# Patient Record
Sex: MALE | Race: WHITE | NOT HISPANIC OR LATINO
[De-identification: names, ages, dates, MRNs, and addresses within clinical notes are randomized per-mention and may not be internally consistent; named-entity substitution may affect disease eponyms.]

---

## 2021-02-03 ENCOUNTER — APPOINTMENT (OUTPATIENT)
Dept: NEUROLOGY | Facility: CLINIC | Age: 81
End: 2021-02-03
Payer: MEDICARE

## 2021-02-03 VITALS
HEIGHT: 64 IN | TEMPERATURE: 97.5 F | SYSTOLIC BLOOD PRESSURE: 132 MMHG | DIASTOLIC BLOOD PRESSURE: 76 MMHG | HEART RATE: 89 BPM | BODY MASS INDEX: 30.56 KG/M2 | WEIGHT: 179 LBS

## 2021-02-03 DIAGNOSIS — Z82.49 FAMILY HISTORY OF ISCHEMIC HEART DISEASE AND OTHER DISEASES OF THE CIRCULATORY SYSTEM: ICD-10-CM

## 2021-02-03 DIAGNOSIS — Z83.3 FAMILY HISTORY OF DIABETES MELLITUS: ICD-10-CM

## 2021-02-03 DIAGNOSIS — R41.3 OTHER AMNESIA: ICD-10-CM

## 2021-02-03 DIAGNOSIS — Z86.79 PERSONAL HISTORY OF OTHER DISEASES OF THE CIRCULATORY SYSTEM: ICD-10-CM

## 2021-02-03 DIAGNOSIS — Z80.0 FAMILY HISTORY OF MALIGNANT NEOPLASM OF DIGESTIVE ORGANS: ICD-10-CM

## 2021-02-03 DIAGNOSIS — Z87.11 PERSONAL HISTORY OF PEPTIC ULCER DISEASE: ICD-10-CM

## 2021-02-03 DIAGNOSIS — Z87.891 PERSONAL HISTORY OF NICOTINE DEPENDENCE: ICD-10-CM

## 2021-02-03 DIAGNOSIS — Z86.718 PERSONAL HISTORY OF OTHER VENOUS THROMBOSIS AND EMBOLISM: ICD-10-CM

## 2021-02-03 DIAGNOSIS — E11.9 TYPE 2 DIABETES MELLITUS W/OUT COMPLICATIONS: ICD-10-CM

## 2021-02-03 DIAGNOSIS — Z78.9 OTHER SPECIFIED HEALTH STATUS: ICD-10-CM

## 2021-02-03 PROCEDURE — 99204 OFFICE O/P NEW MOD 45 MIN: CPT

## 2021-02-03 RX ORDER — OMEPRAZOLE 20 MG/1
20 CAPSULE, DELAYED RELEASE ORAL
Qty: 30 | Refills: 0 | Status: ACTIVE | COMMUNITY
Start: 2020-08-03

## 2021-02-03 RX ORDER — LEVOTHYROXINE SODIUM 0.09 MG/1
88 TABLET ORAL
Qty: 90 | Refills: 0 | Status: ACTIVE | COMMUNITY
Start: 2020-08-03

## 2021-02-03 RX ORDER — LISINOPRIL 10 MG/1
10 TABLET ORAL
Qty: 90 | Refills: 0 | Status: ACTIVE | COMMUNITY
Start: 2020-08-03

## 2021-02-03 RX ORDER — METOPROLOL SUCCINATE 100 MG/1
100 TABLET, EXTENDED RELEASE ORAL
Qty: 90 | Refills: 0 | Status: ACTIVE | COMMUNITY
Start: 2020-08-03

## 2021-02-03 RX ORDER — CHOLECALCIFEROL (VITAMIN D3) 25 MCG
25 MCG TABLET ORAL
Refills: 0 | Status: ACTIVE | COMMUNITY

## 2021-02-03 RX ORDER — INSULIN GLARGINE 100 [IU]/ML
100 INJECTION, SOLUTION SUBCUTANEOUS
Qty: 15 | Refills: 0 | Status: ACTIVE | COMMUNITY
Start: 2020-08-03

## 2021-02-03 RX ORDER — DABIGATRAN ETEXILATE MESYLATE 75 MG/1
75 CAPSULE ORAL TWICE DAILY
Refills: 0 | Status: ACTIVE | COMMUNITY
Start: 2020-08-03

## 2021-02-03 RX ORDER — ATORVASTATIN CALCIUM 20 MG/1
20 TABLET, FILM COATED ORAL
Refills: 0 | Status: ACTIVE | COMMUNITY

## 2021-02-03 RX ORDER — EXENATIDE 2 MG/.85ML
2 INJECTION, SUSPENSION, EXTENDED RELEASE SUBCUTANEOUS
Qty: 3 | Refills: 0 | Status: ACTIVE | COMMUNITY
Start: 2020-08-03

## 2021-02-03 RX ORDER — EMPAGLIFLOZIN 10 MG/1
10 TABLET, FILM COATED ORAL
Qty: 30 | Refills: 0 | Status: ACTIVE | COMMUNITY
Start: 2020-08-03

## 2021-02-03 NOTE — HISTORY OF PRESENT ILLNESS
[FreeTextEntry1] : 80-year-old right-handed male with PMH aches of DM, HTN, CAD, A. fib status post ablation, presents today accompanied by his wife for evaluation of possible recent stroke noted on CT scan head.\par \par Patient's wife who provided history but seems confused about the dates of symptom occurrence reports that in mid January 2021 patient had developed GI symptoms, this lasted for a few days, he seemed dehydrated, he was admitted to [facility] on 1/21/21, where he was initially told he was Covid- 19 positive later, was refuted and was told he had pneumonia but was Covid-19 negative and was discharged home. 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 \par Patient was hence recommended to have a neurological consult; of note, patient has been on Pradaxa but was taking it only once a day, his cardiologist Dr. Walker increased dose to 75 mg twice a day.\par \par Upon obtaining further history, patient reports he has been receiving home PT, his wife reports his mental confusion has improved since past 3-4 days, he denies focal motor weakness, visual blurring, double vision, paresthesias, headaches, gait ataxia or speech disturbance.\par \par \par \par \par \par \par \par \par \par \par \par \par \par \par \par \par \par \par \par \par \par \par \par \par \par \par \par \par \par \par \par \par \par \par \par \par \par \par \par \par \par \par \par \par \par \par \par \par \par \par \par \par \par \par \par \par \par \par \par \par \par \par \par \par \par \par \par \par \par \par \par \par \par \par \par \par \par \par \par \par \par \par \par \par \par \par \par \par \par \par \par \par \par \par \par \par \par \par \par \par \par \par \par \par \par \par \par \par \par \par \par \par \par \par \par \par \par \par \par \par \par \par \par \par \par \par \par \par \par \par \par \par \par \par \par \par \par \par \par \par \par \par \par \par \par \par \par \par \par \par \par \par \par \par \par \par \par \par \par \par \par \par \par \par \par \par \par \par \par \par \par \par \par \par \par \par \par

## 2021-02-03 NOTE — REVIEW OF SYSTEMS
[Feeling Tired] : feeling tired [Sleep Disturbances] : sleep disturbances [Anxiety] : anxiety [Depression] : depression [Change In Personality] : personality change [As Noted in HPI] : as noted in HPI [Memory Lapses or Loss] : memory loss [Decr. Concentrating Ability] : decreased concentrating ability [Poor Coordination] : poor coordination [Negative] : Heme/Lymph

## 2021-02-03 NOTE — DATA REVIEWED
[de-identified] : 1/26/21: CT head: compatible with microvascular ischemic change, note is made of a lacunar infarct in the caudate nuclei bilaterally, new since prior exam is right temporal lobe infarct. \par 1/28/20: A1c 8.1, TFTs normal, CRP 14.50, ESR 64

## 2021-02-03 NOTE — REASON FOR VISIT
[Post Hospitalization] : a post hospitalization visit [Spouse] : spouse [FreeTextEntry1] : Referred by Dr. Claudio for evaluation of possible recent stroke

## 2021-02-03 NOTE — DISCUSSION/SUMMARY
[FreeTextEntry1] : 80-year-old male with PMHx of DM, HTN, CAD, A-fib s/p ablation, was admitted to 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 Of note, patient has been on Pradaxa but was taking it only once a day, his cardiologist Dr. Walker increased dose to 75 mg twice a day.\par \par # Possible new lacunar infarct in addition to prior multiple lacunar infarcts on CT head; no gross residual deficits.\par \par - In order to determine lacunar versus embolic infarcts; I have recommended MRI of the brain\par - Follow up with echocardiogram, Holter and carotid Dopplers with Dr. Walker his cardiologist\par - Continue Pradaxa 75 mg b.i.d. in addition to Atorvastatin 20 mg daily for stroke prophylaxis\par \par # Mild-moderate cognitive impairment\par \par - I recommended follow up in 3-4 weeks, we will perform cognitive assessment to assess this further.\par - Labs: TSH, B12, Fol\par \par \par \par

## 2021-02-03 NOTE — PHYSICAL EXAM
[General Appearance - Alert] : alert [General Appearance - In No Acute Distress] : in no acute distress [General Appearance - Well-Appearing] : healthy appearing [Mood] : the mood was normal [Registration Intact] : recent registration memory intact [Naming Objects] : no difficulty naming common objects [Repeating Phrases] : no difficulty repeating a phrase [Fluency] : fluency intact [Comprehension] : comprehension intact [Cranial Nerves Optic (II)] : visual acuity intact bilaterally,  visual fields full to confrontation, pupils equal round and reactive to light [Cranial Nerves Oculomotor (III)] : extraocular motion intact [Cranial Nerves Facial (VII)] : face symmetrical [Cranial Nerves Vestibulocochlear (VIII)] : hearing was intact bilaterally [Cranial Nerves Accessory (XI - Cranial And Spinal)] : head turning and shoulder shrug symmetric [Cranial Nerves Hypoglossal (XII)] : there was no tongue deviation with protrusion [Motor Tone] : muscle tone was normal in all four extremities [Motor Strength] : muscle strength was normal in all four extremities [Sensation Tactile Decrease] : light touch was intact [2+] : Brachioradialis left 2+ [1+] : Patella left 1+ [0] : Ankle jerk left 0 [PERRL With Normal Accommodation] : pupils were equal in size, round, reactive to light, with normal accommodation [Extraocular Movements] : extraocular movements were intact [Full Visual Field] : full visual field [Hearing Threshold Finger Rub Not [place]tinger] : hearing was normal [Neck Cervical Mass (___cm)] : no neck mass was observed [Auscultation Breath Sounds / Voice Sounds] : lungs were clear to auscultation bilaterally [Heart Rate And Rhythm] : heart rate was normal and rhythm regular [Heart Sounds] : normal S1 and S2 [Arterial Pulses Carotid] : carotid pulses were normal with no bruits [Edema] : there was no peripheral edema [Involuntary Movements] : no involuntary movements were seen [] : no rash [Coordination - Dysmetria Impaired Finger-to-Nose Bilateral] : not present [FreeTextEntry4] : Patient oriented to year and date, not to month, he does not recall the current or recent presidents, short-term recall impaired

## 2021-02-08 ENCOUNTER — NON-APPOINTMENT (OUTPATIENT)
Age: 81
End: 2021-02-08

## 2021-03-16 ENCOUNTER — APPOINTMENT (OUTPATIENT)
Dept: NEUROLOGY | Facility: CLINIC | Age: 81
End: 2021-03-16
Payer: MEDICARE

## 2021-03-16 VITALS
HEIGHT: 64 IN | WEIGHT: 188 LBS | DIASTOLIC BLOOD PRESSURE: 70 MMHG | HEART RATE: 71 BPM | BODY MASS INDEX: 32.1 KG/M2 | TEMPERATURE: 97.2 F | SYSTOLIC BLOOD PRESSURE: 168 MMHG

## 2021-03-16 DIAGNOSIS — I69.393 ATAXIA FOLLOWING CEREBRAL INFARCTION: ICD-10-CM

## 2021-03-16 PROCEDURE — 99214 OFFICE O/P EST MOD 30 MIN: CPT

## 2021-03-16 NOTE — REASON FOR VISIT
[Follow-Up: _____] : a [unfilled] follow-up visit [FreeTextEntry1] : for stroke, MRI brain results and gait instability

## 2021-03-16 NOTE — REVIEW OF SYSTEMS
[Sleep Disturbances] : sleep disturbances [Anxiety] : anxiety [Depression] : depression [As Noted in HPI] : as noted in HPI [Poor Coordination] : poor coordination [Negative] : Heme/Lymph

## 2021-03-16 NOTE — HISTORY OF PRESENT ILLNESS
[FreeTextEntry1] : Pt is here for F/U today, last seen on 2/3/21.Patient reports he has noted remarkable improvement in his mental sensorium, he reports he is clear headed, has no difficulties with articulation, concentration, focusing, mental fog has cleared, has no speech difficulty. He does admit that he has difficulty ascending and descending stairs, his gait is mildly unstable. \par \par MRI brain reveals scattered chronic infarcts, largest in the right temporal lobe, no acute infarct is noted.\par \par I recommended continuing Pradaxa and atorvastatin\par \par Patient is requesting clearance to return to work, he does admit since his gait is unstable he could benefit from physical therapy before going back full time\par \par

## 2021-03-16 NOTE — DATA REVIEWED
[de-identified] : 2/4/21: MRI brain reveals scattered chronic infarcts, largest in the right temporal lobe, no acute infarct is noted.\par \par  [de-identified] : 1/26/21: CT head: compatible with microvascular ischemic change, note is made of a lacunar infarct in the caudate nuclei bilaterally, new since prior exam is right temporal lobe infarct. \par 1/28/20: A1c 8.1, TFTs normal, CRP 14.50, ESR 64

## 2021-03-16 NOTE — DISCUSSION/SUMMARY
[FreeTextEntry1] : 80-year-old male with PMHx of DM, HTN, CAD, A-fib s/p ablation, was admitted to [facility] on 1/21/21, initially told positive Covid-19, refuted, then told he had pneumonia, Covid-19 negative, upon discharged home, continued to be confused, CT head ordered by PMD revealed lacunar infarcts B/L caudate nuclei and a new right temporal lobe infarct. Of note, patient has been on Pradaxa but was taking it only once a day, his cardiologist Dr. Walker increased dose to 75 mg twice a day.\par \par # New lacunar infarct, prior multiple lacunar infarcts on CT head; MRI brain reveals scattered chronic infarcts, largest in the right temporal lobe, no acute infarct is noted.\par \par # Unstable gait: could be multifactorial, subcortical arthropathy secondary to multiple strokes, diabetic neuropathy as well as recent illness may all be contributory.\par \par - Continue Pradaxa 75 mg b.i.d. in addition to Atorvastatin 20 mg daily for stroke prophylaxis\par - Start Physical therapy twice weekly x 4 weeks\par - Will follow up with patient in 4-5 weeks, if improved can return to work\par \par # Mild cognitive impairment/ Acute encephalopathy - most likely toxic metabolic; fully resolved\par \par - No further workup needed\par \par \par \par

## 2021-04-21 ENCOUNTER — APPOINTMENT (OUTPATIENT)
Dept: NEUROLOGY | Facility: CLINIC | Age: 81
End: 2021-04-21
Payer: MEDICARE

## 2021-04-21 VITALS
SYSTOLIC BLOOD PRESSURE: 124 MMHG | TEMPERATURE: 97.5 F | WEIGHT: 190 LBS | HEIGHT: 64 IN | HEART RATE: 69 BPM | BODY MASS INDEX: 32.44 KG/M2 | DIASTOLIC BLOOD PRESSURE: 68 MMHG

## 2021-04-21 PROCEDURE — 99214 OFFICE O/P EST MOD 30 MIN: CPT

## 2021-04-21 NOTE — REVIEW OF SYSTEMS
[As Noted in HPI] : as noted in HPI [Poor Coordination] : poor coordination [Negative] : Psychiatric

## 2021-04-21 NOTE — DATA REVIEWED
[de-identified] : 2/4/21: MRI brain reveals scattered chronic infarcts, largest in the right temporal lobe, no acute infarct is noted.\par \par  [de-identified] : 1/26/21: CT head: compatible with microvascular ischemic change, note is made of a lacunar infarct in the caudate nuclei bilaterally, new since prior exam is right temporal lobe infarct. \par 1/28/20: A1c 8.1, TFTs normal, CRP 14.50, ESR 64

## 2021-04-21 NOTE — DISCUSSION/SUMMARY
[FreeTextEntry1] : 80-year-old male with PMHx of DM, HTN, CAD, A-fib s/p ablation, was admitted to [facility] on 1/21/21, initially told positive Covid-19, then told he had pneumonia, Covid-19 negative, upon d/c home, was confused, CT head ordered by PMD revealed lacunar infarcts and a new right temporal lobe infarct. Of note, patient is on Pradaxa but was taking it only once a day, his cardio increased dose to 75 mg bid.\par \par # New lacunar infarct, prior multiple lacunar infarcts on CT head; MRI brain reveals scattered chronic infarcts, largest in the right temporal lobe, no acute infarct is noted.\par \par # Unstable gait: could be multifactorial, subcortical arthropathy secondary to multiple strokes, diabetic neuropathy as well as recent illness may all be contributory.\par \par - Continue Pradaxa 75 mg b.i.d. in addition to Atorvastatin 20 mg daily for stroke prophylaxis\par - continue Physical therapy twice weekly x 3 more weeks\par - follow up in 8 weeks,\par - Pt may return to work, he has been advised to be cautious about ascending and descending stairs and carrying heavy objects\par \par # Mild cognitive impairment/ Acute encephalopathy - most likely toxic metabolic; fully resolved\par \par - No further workup needed\par \par \par \par

## 2021-04-21 NOTE — HISTORY OF PRESENT ILLNESS
[FreeTextEntry1] : Patient is here for follow up visit today, last seen on 3/16/21. He reports he has been attending physical therapy, he has noted remarkable improvement in his balance and gait, he states that he is able to go down the basement stairs with caution, he has not had any falls, denies focal motor weakness. \par \par Patient denies cognitive difficulties, he is articulating and verbalizing normally, he denies difficulties with concentration or focusing or speech difficulty.\par \par Patient is on Pradaxa and atorvastatin for stroke prophylaxis.\par \par The patient wishes to return back to work in the next 2 weeks, he reports he feels fit to go back to his job full time.

## 2021-04-21 NOTE — PHYSICAL EXAM
[General Appearance - Alert] : alert [General Appearance - In No Acute Distress] : in no acute distress [General Appearance - Well-Appearing] : healthy appearing [Mood] : the mood was normal [Person] : oriented to person [Place] : oriented to place [Time] : oriented to time [Registration Intact] : recent registration memory intact [Naming Objects] : no difficulty naming common objects [Repeating Phrases] : no difficulty repeating a phrase [Fluency] : fluency intact [Comprehension] : comprehension intact [Cranial Nerves Optic (II)] : visual acuity intact bilaterally,  visual fields full to confrontation, pupils equal round and reactive to light [Cranial Nerves Oculomotor (III)] : extraocular motion intact [Cranial Nerves Facial (VII)] : face symmetrical [Cranial Nerves Vestibulocochlear (VIII)] : hearing was intact bilaterally [Cranial Nerves Accessory (XI - Cranial And Spinal)] : head turning and shoulder shrug symmetric [Cranial Nerves Hypoglossal (XII)] : there was no tongue deviation with protrusion [Motor Tone] : muscle tone was normal in all four extremities [Motor Strength] : muscle strength was normal in all four extremities [Sensation Tactile Decrease] : light touch was intact [2+] : Brachioradialis left 2+ [1+] : Patella left 1+ [0] : Ankle jerk left 0 [Coordination - Dysmetria Impaired Finger-to-Nose Bilateral] : not present [FreeTextEntry4] : Recall the current and recent past presidents [FreeTextEntry8] : Gait: Based-based gait: able to walk on toes, difficulty walking  tandem

## 2021-04-21 NOTE — REASON FOR VISIT
[Follow-Up: _____] : a [unfilled] follow-up visit [FreeTextEntry1] : for gait instability / Patient needs clearance to return back to work.

## 2021-06-02 ENCOUNTER — APPOINTMENT (OUTPATIENT)
Dept: NEUROLOGY | Facility: CLINIC | Age: 81
End: 2021-06-02
Payer: MEDICARE

## 2021-06-02 VITALS
HEIGHT: 64 IN | SYSTOLIC BLOOD PRESSURE: 134 MMHG | TEMPERATURE: 97.3 F | WEIGHT: 190 LBS | DIASTOLIC BLOOD PRESSURE: 71 MMHG | BODY MASS INDEX: 32.44 KG/M2 | HEART RATE: 81 BPM

## 2021-06-02 DIAGNOSIS — I63.81 OTHER CEREBRAL INFARCTION DUE TO OCCLUSION OR STENOSIS OF SMALL ARTERY: ICD-10-CM

## 2021-06-02 DIAGNOSIS — R26.81 UNSTEADINESS ON FEET: ICD-10-CM

## 2021-06-02 PROCEDURE — 99214 OFFICE O/P EST MOD 30 MIN: CPT

## 2021-06-02 NOTE — DISCUSSION/SUMMARY
[FreeTextEntry1] : 80-year-old male with PMHx of DM, HTN, CAD, A-fib s/p ablation, was admitted to [facility] on 1/21/21, initially told positive Covid-19, then told he had pneumonia, Covid-19 negative, upon d/c home, was confused, CT head ordered by PMD revealed lacunar infarcts and a new right temporal lobe infarct. Of note, patient is on Pradaxa but was taking it only once a day, his cardio increased dose to 75 mg bid.\par \par # New lacunar infarct, prior multiple lacunar infarcts on CT head; MRI brain reveals scattered chronic infarcts, largest in the right temporal lobe, no acute infarct is noted.\par \par # Unstable gait: could be multifactorial, subcortical arthropathy secondary to multiple strokes, diabetic neuropathy as well as recent illness may all be contributory.\par \par - Continue Pradaxa 75 mg b.i.d. in addition to Atorvastatin 20 mg daily for stroke prophylaxis\par - continue Physical therapy twice weekly x 4 weeks\par - follow up in 6 months\par \par # Mild cognitive impairment/ Acute encephalopathy - most likely toxic metabolic; fully resolved\par \par - No further workup needed\par \par \par \par

## 2021-06-02 NOTE — PHYSICAL EXAM
[General Appearance - Alert] : alert [General Appearance - In No Acute Distress] : in no acute distress [General Appearance - Well-Appearing] : healthy appearing [Mood] : the mood was normal [Person] : oriented to person [Place] : oriented to place [Time] : oriented to time [Registration Intact] : recent registration memory intact [Naming Objects] : no difficulty naming common objects [Repeating Phrases] : no difficulty repeating a phrase [Fluency] : fluency intact [Comprehension] : comprehension intact [Cranial Nerves Optic (II)] : visual acuity intact bilaterally,  visual fields full to confrontation, pupils equal round and reactive to light [Cranial Nerves Oculomotor (III)] : extraocular motion intact [Cranial Nerves Facial (VII)] : face symmetrical [Cranial Nerves Vestibulocochlear (VIII)] : hearing was intact bilaterally [Cranial Nerves Accessory (XI - Cranial And Spinal)] : head turning and shoulder shrug symmetric [Cranial Nerves Hypoglossal (XII)] : there was no tongue deviation with protrusion [Motor Tone] : muscle tone was normal in all four extremities [Motor Strength] : muscle strength was normal in all four extremities [Sensation Tactile Decrease] : light touch was intact [2+] : Brachioradialis left 2+ [1+] : Patella left 1+ [0] : Ankle jerk left 0 [PERRL With Normal Accommodation] : pupils were equal in size, round, reactive to light, with normal accommodation [Extraocular Movements] : extraocular movements were intact [Full Visual Field] : full visual field [Coordination - Dysmetria Impaired Finger-to-Nose Bilateral] : not present [FreeTextEntry4] : Recall the current and recent past presidents [FreeTextEntry8] : Gait: Based-based gait: able to walk on toes, difficulty walking  tandem

## 2021-06-02 NOTE — HISTORY OF PRESENT ILLNESS
[FreeTextEntry1] : Patient is here for follow up visit today, last seen on 4/21/21. He reports he has gone back to work, has no difficulty performing his usual functions, he reports he avoids going down the stairs but is able to do all his other tasks.\par \par Pt has been attending physical therapy, he has noted remarkable improvement in his balance and gait, he has not had any falls, denies focal motor weakness. \par \par Patient denies cognitive difficulties, he is articulating and verbalizing normally, he denies difficulties with concentration or focusing or speech difficulty.\par \par Patient is on Pradaxa and atorvastatin for stroke prophylaxis.\par \par Pt repots his blood sugar is well controlled, \par

## 2021-06-02 NOTE — DATA REVIEWED
[de-identified] : 2/4/21: MRI brain reveals scattered chronic infarcts, largest in the right temporal lobe, no acute infarct is noted.\par \par  [de-identified] : 1/26/21: CT head: compatible with microvascular ischemic change, note is made of a lacunar infarct in the caudate nuclei bilaterally, new since prior exam is right temporal lobe infarct. \par 1/28/20: A1c 8.1, TFTs normal, CRP 14.50, ESR 64

## 2021-12-06 ENCOUNTER — APPOINTMENT (OUTPATIENT)
Dept: NEUROLOGY | Facility: CLINIC | Age: 81
End: 2021-12-06

## 2023-03-13 ENCOUNTER — OFFICE (OUTPATIENT)
Dept: URBAN - METROPOLITAN AREA CLINIC 12 | Facility: CLINIC | Age: 83
Setting detail: OPHTHALMOLOGY
End: 2023-03-13
Payer: MEDICARE

## 2023-03-13 DIAGNOSIS — H90.3: ICD-10-CM

## 2023-03-13 PROCEDURE — 92567 TYMPANOMETRY: CPT

## 2023-03-13 PROCEDURE — 92557 COMPREHENSIVE HEARING TEST: CPT

## 2023-03-27 ENCOUNTER — OFFICE (OUTPATIENT)
Dept: URBAN - METROPOLITAN AREA CLINIC 12 | Facility: CLINIC | Age: 83
Setting detail: OPHTHALMOLOGY
End: 2023-03-27

## 2023-03-27 DIAGNOSIS — H90.3: ICD-10-CM

## 2023-03-27 PROCEDURE — 92593 HEARING AID CHECK; BINAURAL: CPT

## 2023-04-10 ENCOUNTER — OFFICE (OUTPATIENT)
Dept: URBAN - METROPOLITAN AREA CLINIC 12 | Facility: CLINIC | Age: 83
Setting detail: OPHTHALMOLOGY
End: 2023-04-10
Payer: MEDICARE

## 2023-04-10 DIAGNOSIS — H35.373: ICD-10-CM

## 2023-04-10 DIAGNOSIS — Z96.1: ICD-10-CM

## 2023-04-10 DIAGNOSIS — H40.1132: ICD-10-CM

## 2023-04-10 DIAGNOSIS — H02.403: ICD-10-CM

## 2023-04-10 DIAGNOSIS — H43.813: ICD-10-CM

## 2023-04-10 DIAGNOSIS — E11.9: ICD-10-CM

## 2023-04-10 PROCEDURE — 92014 COMPRE OPH EXAM EST PT 1/>: CPT | Performed by: OPHTHALMOLOGY

## 2023-04-10 PROCEDURE — 92250 FUNDUS PHOTOGRAPHY W/I&R: CPT | Performed by: OPHTHALMOLOGY

## 2023-04-10 ASSESSMENT — PACHYMETRY
OD_CT_UM: 534
OS_CT_UM: 529
OD_CT_CORRECTION: 1
OS_CT_CORRECTION: 1

## 2023-04-10 ASSESSMENT — REFRACTION_CURRENTRX
OD_VPRISM_DIRECTION: SV
OD_OVR_VA: 20/
OS_OVR_VA: 20/
OS_ADD: +2.00
OS_VPRISM_DIRECTION: SV
OD_ADD: +2.00

## 2023-04-10 ASSESSMENT — KERATOMETRY
OS_AXISANGLE_DEGREES: 96
OD_K2POWER_DIOPTERS: 45.50
OD_K1POWER_DIOPTERS: 44.50
OD_AXISANGLE_DEGREES: 44
OS_K2POWER_DIOPTERS: 45.75
OS_K1POWER_DIOPTERS: 44.75
METHOD_AUTO_MANUAL: AUTO

## 2023-04-10 ASSESSMENT — REFRACTION_MANIFEST
OD_SPHERE: -0.25
OU_VA: 20/20-1
OD_VA1: 20/25
OD_AXIS: 113
OS_VA1: 20/25-
OD_CYLINDER: -0.75
OS_AXIS: 131
OS_CYLINDER: -0.25
OS_SPHERE: PLANO

## 2023-04-10 ASSESSMENT — VISUAL ACUITY
OD_BCVA: 20/30
OS_BCVA: 20/40

## 2023-04-10 ASSESSMENT — TONOMETRY
OS_IOP_MMHG: 18
OD_IOP_MMHG: 21

## 2023-04-10 ASSESSMENT — REFRACTION_AUTOREFRACTION
OD_AXIS: 114
OS_SPHERE: +0.75
OD_CYLINDER: -1.00
OS_AXIS: 82
OS_CYLINDER: -0.50
OD_SPHERE: PLANO

## 2023-04-10 ASSESSMENT — LID POSITION - PTOSIS
OD_PTOSIS: RUL 2+
OS_PTOSIS: LUL 1+

## 2023-04-10 ASSESSMENT — AXIALLENGTH_DERIVED
OS_AL: 22.783
OD_AL: 23.2884

## 2023-04-10 ASSESSMENT — CONFRONTATIONAL VISUAL FIELD TEST (CVF)
OS_FINDINGS: FULL
OD_FINDINGS: FULL

## 2023-04-10 ASSESSMENT — SPHEQUIV_DERIVED
OD_SPHEQUIV: -0.625
OS_SPHEQUIV: 0.5

## 2023-07-10 ENCOUNTER — OFFICE (OUTPATIENT)
Dept: URBAN - METROPOLITAN AREA CLINIC 12 | Facility: CLINIC | Age: 83
Setting detail: OPHTHALMOLOGY
End: 2023-07-10
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
## 2023-07-10 DIAGNOSIS — H90.3: ICD-10-CM

## 2023-07-10 PROCEDURE — NO SHOW FE NO SHOW FEE

## 2023-07-25 ENCOUNTER — Encounter (OUTPATIENT)
Dept: URBAN - METROPOLITAN AREA CLINIC 12 | Facility: CLINIC | Age: 83
Setting detail: OPHTHALMOLOGY
End: 2023-07-25
Payer: MEDICARE

## 2023-10-01 PROBLEM — I63.81 LACUNAR INFARCTION: Status: ACTIVE | Noted: 2021-02-03

## 2023-10-23 ENCOUNTER — OFFICE (OUTPATIENT)
Dept: URBAN - METROPOLITAN AREA CLINIC 12 | Facility: CLINIC | Age: 83
Setting detail: OPHTHALMOLOGY
End: 2023-10-23
Payer: MEDICARE

## 2023-10-23 DIAGNOSIS — H35.373: ICD-10-CM

## 2023-10-23 DIAGNOSIS — H40.1132: ICD-10-CM

## 2023-10-23 DIAGNOSIS — E11.9: ICD-10-CM

## 2023-10-23 DIAGNOSIS — H43.813: ICD-10-CM

## 2023-10-23 DIAGNOSIS — H02.403: ICD-10-CM

## 2023-10-23 PROCEDURE — 92083 EXTENDED VISUAL FIELD XM: CPT | Performed by: STUDENT IN AN ORGANIZED HEALTH CARE EDUCATION/TRAINING PROGRAM

## 2023-10-23 PROCEDURE — 99214 OFFICE O/P EST MOD 30 MIN: CPT | Performed by: STUDENT IN AN ORGANIZED HEALTH CARE EDUCATION/TRAINING PROGRAM

## 2023-10-23 PROCEDURE — 92133 CPTRZD OPH DX IMG PST SGM ON: CPT | Performed by: STUDENT IN AN ORGANIZED HEALTH CARE EDUCATION/TRAINING PROGRAM

## 2023-10-23 ASSESSMENT — SPHEQUIV_DERIVED
OS_SPHEQUIV: 0.125
OD_SPHEQUIV: -0.625
OD_SPHEQUIV: -0.75

## 2023-10-23 ASSESSMENT — REFRACTION_AUTOREFRACTION
OD_SPHERE: -0.25
OD_CYLINDER: -1.00
OD_AXIS: 104
OS_AXIS: 122
OS_SPHERE: +0.25
OS_CYLINDER: -0.25

## 2023-10-23 ASSESSMENT — REFRACTION_CURRENTRX
OD_OVR_VA: 20/
OS_ADD: +2.00
OD_ADD: +2.00
OS_OVR_VA: 20/
OD_VPRISM_DIRECTION: SV
OS_VPRISM_DIRECTION: SV

## 2023-10-23 ASSESSMENT — REFRACTION_MANIFEST
OD_SPHERE: -0.25
OS_AXIS: 131
OD_AXIS: 113
OS_CYLINDER: -0.25
OS_SPHERE: PLANO
OD_VA1: 20/25
OU_VA: 20/20-1
OD_CYLINDER: -0.75
OS_VA1: 20/25-

## 2023-10-23 ASSESSMENT — AXIALLENGTH_DERIVED
OD_AL: 23.3782
OD_AL: 23.4261
OS_AL: 23.1389

## 2023-10-23 ASSESSMENT — KERATOMETRY
OD_K1POWER_DIOPTERS: 44.50
OD_K2POWER_DIOPTERS: 45.00
OS_K1POWER_DIOPTERS: 44.25
OS_AXISANGLE_DEGREES: 080
METHOD_AUTO_MANUAL: AUTO
OS_K2POWER_DIOPTERS: 45.00
OD_AXISANGLE_DEGREES: 028

## 2023-10-23 ASSESSMENT — PACHYMETRY
OS_CT_CORRECTION: 1
OD_CT_UM: 534
OD_CT_CORRECTION: 1
OS_CT_UM: 529

## 2023-10-23 ASSESSMENT — VISUAL ACUITY
OD_BCVA: 20/40
OS_BCVA: 20/50+1

## 2023-10-23 ASSESSMENT — TONOMETRY: OS_IOP_MMHG: 21

## 2023-10-23 ASSESSMENT — CONFRONTATIONAL VISUAL FIELD TEST (CVF)
OS_FINDINGS: FULL
OD_FINDINGS: FULL

## 2023-10-23 ASSESSMENT — LID POSITION - PTOSIS
OD_PTOSIS: RUL 2+
OS_PTOSIS: LUL 1+

## 2023-12-10 ENCOUNTER — RX ONLY (RX ONLY)
Age: 83
End: 2023-12-10

## 2023-12-10 ENCOUNTER — OFFICE (OUTPATIENT)
Dept: URBAN - METROPOLITAN AREA CLINIC 12 | Facility: CLINIC | Age: 83
Setting detail: OPHTHALMOLOGY
End: 2023-12-10
Payer: MEDICARE

## 2023-12-10 DIAGNOSIS — H40.1132: ICD-10-CM

## 2023-12-10 PROCEDURE — 99213 OFFICE O/P EST LOW 20 MIN: CPT | Performed by: STUDENT IN AN ORGANIZED HEALTH CARE EDUCATION/TRAINING PROGRAM

## 2023-12-10 ASSESSMENT — REFRACTION_AUTOREFRACTION
OD_SPHERE: -0.25
OS_SPHERE: +0.25
OD_CYLINDER: -0.75
OD_AXIS: 110
OS_AXIS: 088
OS_CYLINDER: -0.50

## 2023-12-10 ASSESSMENT — REFRACTION_CURRENTRX
OD_VPRISM_DIRECTION: SV
OS_ADD: +2.00
OD_OVR_VA: 20/
OS_VPRISM_DIRECTION: SV
OS_OVR_VA: 20/
OD_ADD: +2.00

## 2023-12-10 ASSESSMENT — LID POSITION - PTOSIS
OS_PTOSIS: LUL 1+
OD_PTOSIS: RUL 2+

## 2023-12-10 ASSESSMENT — SPHEQUIV_DERIVED
OD_SPHEQUIV: -0.625
OS_SPHEQUIV: 0
OD_SPHEQUIV: -0.625

## 2023-12-10 ASSESSMENT — CONFRONTATIONAL VISUAL FIELD TEST (CVF)
OD_FINDINGS: FULL
OS_FINDINGS: FULL

## 2023-12-10 ASSESSMENT — REFRACTION_MANIFEST
OD_AXIS: 113
OU_VA: 20/20-1
OS_AXIS: 131
OD_CYLINDER: -0.75
OD_SPHERE: -0.25
OD_VA1: 20/25
OS_VA1: 20/25-
OS_CYLINDER: -0.25
OS_SPHERE: PLANO

## 2024-08-26 ENCOUNTER — OFFICE (OUTPATIENT)
Dept: URBAN - METROPOLITAN AREA CLINIC 12 | Facility: CLINIC | Age: 84
Setting detail: OPHTHALMOLOGY
End: 2024-08-26

## 2024-08-26 DIAGNOSIS — H90.3: ICD-10-CM

## 2024-08-26 PROCEDURE — CANCEL CANCEL

## 2025-04-02 ENCOUNTER — OFFICE (OUTPATIENT)
Dept: URBAN - METROPOLITAN AREA CLINIC 12 | Facility: CLINIC | Age: 85
Setting detail: OPHTHALMOLOGY
End: 2025-04-02
Payer: MEDICARE

## 2025-04-02 DIAGNOSIS — H90.3: ICD-10-CM

## 2025-04-02 PROCEDURE — 92557 COMPREHENSIVE HEARING TEST: CPT | Mod: AB

## 2025-04-02 PROCEDURE — 92567 TYMPANOMETRY: CPT | Mod: AB

## 2025-04-07 ENCOUNTER — OFFICE (OUTPATIENT)
Dept: URBAN - METROPOLITAN AREA CLINIC 12 | Facility: CLINIC | Age: 85
Setting detail: OPHTHALMOLOGY
End: 2025-04-07

## 2025-04-07 DIAGNOSIS — H90.3: ICD-10-CM

## 2025-04-07 PROCEDURE — HAD HEARING AID DISPENSE

## 2025-04-07 PROCEDURE — V5261 HEARING AID, DIGIT, BIN, BTE: HCPCS

## 2025-04-21 ENCOUNTER — OFFICE (OUTPATIENT)
Dept: URBAN - METROPOLITAN AREA CLINIC 12 | Facility: CLINIC | Age: 85
Setting detail: OPHTHALMOLOGY
End: 2025-04-21

## 2025-04-21 DIAGNOSIS — H90.3: ICD-10-CM

## 2025-04-21 PROCEDURE — 92593 HEARING AID CHECK; BINAURAL: CPT

## 2025-05-07 ENCOUNTER — OFFICE (OUTPATIENT)
Dept: URBAN - METROPOLITAN AREA CLINIC 12 | Facility: CLINIC | Age: 85
Setting detail: OPHTHALMOLOGY
End: 2025-05-07

## 2025-05-07 DIAGNOSIS — H90.3: ICD-10-CM

## 2025-05-07 PROCEDURE — 92593 HEARING AID CHECK; BINAURAL: CPT

## 2025-06-11 ENCOUNTER — OFFICE (OUTPATIENT)
Dept: URBAN - METROPOLITAN AREA CLINIC 12 | Facility: CLINIC | Age: 85
Setting detail: OPHTHALMOLOGY
End: 2025-06-11

## 2025-06-11 DIAGNOSIS — H90.3: ICD-10-CM

## 2025-06-11 PROCEDURE — 92593 HEARING AID CHECK; BINAURAL: CPT
